# Patient Record
Sex: FEMALE | Race: AMERICAN INDIAN OR ALASKA NATIVE | Employment: FULL TIME | URBAN - METROPOLITAN AREA
[De-identification: names, ages, dates, MRNs, and addresses within clinical notes are randomized per-mention and may not be internally consistent; named-entity substitution may affect disease eponyms.]

---

## 2024-01-12 NOTE — PROGRESS NOTES
ADULT ANNUAL PHYSICAL  [facility] [place]    NAME: Mari Eisenberg  AGE: 28 y.o. SEX: female  : 1995     DATE: 1/15/2024     Assessment and Plan:     Problem List Items Addressed This Visit       Obesity     No comorbidities     Plan:  Lifestyle modifications           Family planning     POCT pregnancy test negative  LMP: 1/10/24    Plan:  Resume Sronyx          Relevant Medications    levonorgestrel-ethinyl estradiol (Sronyx) 0.1-20 MG-MCG per tablet    Other Relevant Orders    POCT urine HCG    Encounter for 's license history and physical - Primary     Patient does not appear to have any medical disorder that would prevent him from driving safely  Advised to report back to [facility] immediately if any new conditions or limitations develop.   Advised not to use alcohol, drugs, or substances which inhibit ability to operate a vehicle.   Advised not use cell phone or other devices which can distract while operating a vehicle.   Form filled out, signed, and handed back to the patient.           Other Visit Diagnoses       Need for hepatitis C screening test        Relevant Orders    Hepatitis C Antibody    Lipid panel    Comprehensive metabolic panel            Immunizations and preventive care screenings were discussed with patient today. Appropriate education was printed on patient's after visit summary.    Counseling:  Alcohol/drug use: discussed moderation in alcohol intake, the recommendations for healthy alcohol use, and avoidance of illicit drug use.  Sexual health: discussed sexually transmitted diseases, partner selection, use of condoms, avoidance of unintended pregnancy, and contraceptive alternatives.  Exercise: the importance of regular exercise/physical activity was discussed. Recommend exercise 3-5 times per week for at least 30 minutes.          Return in about 1 year (around 1/15/2025) for  Annual physical.     Chief Complaint:     Chief Complaint   Patient presents with    New Patient Visit     NP here to establish care      History of Present Illness:     Adult Annual Physical   Patient here for a comprehensive physical exam. The patient reports no problems.    Diet and Physical Activity  Diet/Nutrition: frequent junk food and limited fruits/vegetables.   Exercise: no formal exercise.      Depression Screening  PHQ-2/9 Depression Screening    Little interest or pleasure in doing things: 0 - not at all  Feeling down, depressed, or hopeless: 0 - not at all  PHQ-2 Score: 0  PHQ-2 Interpretation: Negative depression screen       General Health  Sleep: gets 7-8 hours of sleep on average.   Hearing: normal - bilateral.  Vision: no vision problems.   Dental: regular dental visits, brushes teeth twice daily, and flosses teeth occasionally.       /GYN Health  Follows with gynecology? No. Last pap smear 8 months ago (normal)  Last menstrual period: 1/10/2024  Contraceptive method:  none  . Used to be on Sronyx. Denies smoking, migraines or history of blood clots   History of STDs?: no.     Advanced Care Planning  Do you have an advanced directive? no  Do you have a durable medical power of ? no     Review of Systems:     Review of Systems   Constitutional:  Negative for chills and fever.   HENT:  Negative for ear pain and sore throat.    Eyes:  Negative for pain and visual disturbance.   Respiratory:  Negative for cough and shortness of breath.    Cardiovascular:  Negative for chest pain and palpitations.   Gastrointestinal:  Negative for abdominal pain and vomiting.   Genitourinary:  Negative for dysuria and hematuria.   Musculoskeletal:  Negative for arthralgias and back pain.   Skin:  Negative for color change and rash.   Neurological:  Negative for seizures and syncope.   All other systems reviewed and are negative.     Past Medical History:     No past medical history on file.   Past Surgical  "History:     No past surgical history on file.   Social History:     Social History     Socioeconomic History    Marital status: /Civil Union     Spouse name: None    Number of children: None    Years of education: None    Highest education level: None   Occupational History    None   Tobacco Use    Smoking status: Never     Passive exposure: Never    Smokeless tobacco: Never   Vaping Use    Vaping status: Never Used   Substance and Sexual Activity    Alcohol use: Not Currently    Drug use: Never    Sexual activity: None   Other Topics Concern    None   Social History Narrative    None     Social Determinants of Health     Financial Resource Strain: Low Risk  (1/15/2024)    Overall Financial Resource Strain (CARDIA)     Difficulty of Paying Living Expenses: Not hard at all   Food Insecurity: No Food Insecurity (1/15/2024)    Hunger Vital Sign     Worried About Running Out of Food in the Last Year: Never true     Ran Out of Food in the Last Year: Never true   Transportation Needs: No Transportation Needs (1/15/2024)    PRAPARE - Transportation     Lack of Transportation (Medical): No     Lack of Transportation (Non-Medical): No   Physical Activity: Not on file   Stress: Not on file   Social Connections: Not on file   Intimate Partner Violence: Not on file   Housing Stability: Not on file      Family History:     No family history on file.   Current Medications:     Current Outpatient Medications   Medication Sig Dispense Refill    levonorgestrel-ethinyl estradiol (Sronyx) 0.1-20 MG-MCG per tablet Take 1 tablet by mouth daily 84 tablet 3     No current facility-administered medications for this visit.      Allergies:     No Known Allergies   Physical Exam:     /76 (BP Location: Right arm, Patient Position: Sitting, Cuff Size: Standard)   Pulse 82   Temp 97.8 °F (36.6 °C) (Temporal)   Resp 18   Ht 5' 6\" (1.676 m)   Wt 85.7 kg (189 lb)   LMP 01/14/2024 (Exact Date)   SpO2 99%   BMI 30.51 kg/m² "     Physical Exam  Vitals and nursing note reviewed.   Constitutional:       General: She is not in acute distress.     Appearance: She is well-developed.   HENT:      Head: Normocephalic and atraumatic.      Right Ear: External ear normal.      Left Ear: External ear normal.      Nose: Nose normal.   Eyes:      General: No scleral icterus.     Conjunctiva/sclera: Conjunctivae normal.   Cardiovascular:      Rate and Rhythm: Normal rate and regular rhythm.      Pulses: Normal pulses.      Heart sounds: Normal heart sounds. No murmur heard.  Pulmonary:      Effort: Pulmonary effort is normal. No respiratory distress.      Breath sounds: Normal breath sounds.   Abdominal:      Palpations: Abdomen is soft.      Tenderness: There is no abdominal tenderness.   Musculoskeletal:         General: No swelling.      Cervical back: Neck supple.      Right lower leg: No edema.      Left lower leg: No edema.   Skin:     General: Skin is warm and dry.      Capillary Refill: Capillary refill takes less than 2 seconds.   Neurological:      General: No focal deficit present.      Mental Status: She is alert and oriented to person, place, and time.   Psychiatric:         Mood and Affect: Mood normal.         Behavior: Behavior normal.          Rody Morataya MD   [facility]

## 2024-01-15 ENCOUNTER — OFFICE VISIT (OUTPATIENT)
Dept: FAMILY MEDICINE CLINIC | Facility: CLINIC | Age: 29
End: 2024-01-15

## 2024-01-15 VITALS
RESPIRATION RATE: 18 BRPM | HEART RATE: 82 BPM | SYSTOLIC BLOOD PRESSURE: 114 MMHG | HEIGHT: 66 IN | BODY MASS INDEX: 30.37 KG/M2 | TEMPERATURE: 97.8 F | WEIGHT: 189 LBS | OXYGEN SATURATION: 99 % | DIASTOLIC BLOOD PRESSURE: 76 MMHG

## 2024-01-15 DIAGNOSIS — Z11.59 NEED FOR HEPATITIS C SCREENING TEST: ICD-10-CM

## 2024-01-15 DIAGNOSIS — Z30.09 FAMILY PLANNING: ICD-10-CM

## 2024-01-15 DIAGNOSIS — E66.09 CLASS 1 OBESITY DUE TO EXCESS CALORIES WITHOUT SERIOUS COMORBIDITY WITH BODY MASS INDEX (BMI) OF 30.0 TO 30.9 IN ADULT: ICD-10-CM

## 2024-01-15 DIAGNOSIS — Z02.4 ENCOUNTER FOR DRIVER'S LICENSE HISTORY AND PHYSICAL: Primary | ICD-10-CM

## 2024-01-15 PROBLEM — Z00.00 ANNUAL PHYSICAL EXAM: Status: ACTIVE | Noted: 2024-01-15

## 2024-01-15 PROBLEM — E66.9 OBESITY: Status: ACTIVE | Noted: 2024-01-15

## 2024-01-15 LAB — SL AMB POCT URINE HCG: NEGATIVE

## 2024-01-15 PROCEDURE — 81025 URINE PREGNANCY TEST: CPT | Performed by: FAMILY MEDICINE

## 2024-01-15 PROCEDURE — 99395 PREV VISIT EST AGE 18-39: CPT | Performed by: FAMILY MEDICINE

## 2024-01-15 RX ORDER — LEVONORGESTREL AND ETHINYL ESTRADIOL 0.1-0.02MG
1 KIT ORAL DAILY
Qty: 84 TABLET | Refills: 3 | Status: SHIPPED | OUTPATIENT
Start: 2024-01-15

## 2024-01-15 NOTE — ASSESSMENT & PLAN NOTE
Patient does not appear to have any medical disorder that would prevent him from driving safely  Advised to report back to [facility] immediately if any new conditions or limitations develop.   Advised not to use alcohol, drugs, or substances which inhibit ability to operate a vehicle.   Advised not use cell phone or other devices which can distract while operating a vehicle.   Form filled out, signed, and handed back to the patient.

## 2024-01-24 ENCOUNTER — APPOINTMENT (OUTPATIENT)
Dept: RADIOLOGY | Age: 29
End: 2024-01-24
Payer: OTHER MISCELLANEOUS

## 2024-01-24 ENCOUNTER — OCCMED (OUTPATIENT)
Dept: URGENT CARE | Age: 29
End: 2024-01-24
Payer: OTHER MISCELLANEOUS

## 2024-01-24 DIAGNOSIS — S69.91XA HAND INJURY, RIGHT, INITIAL ENCOUNTER: Primary | ICD-10-CM

## 2024-01-24 DIAGNOSIS — S69.91XA HAND INJURY, RIGHT, INITIAL ENCOUNTER: ICD-10-CM

## 2024-01-24 PROCEDURE — 99283 EMERGENCY DEPT VISIT LOW MDM: CPT | Performed by: NURSE PRACTITIONER

## 2024-01-24 PROCEDURE — 73110 X-RAY EXAM OF WRIST: CPT

## 2024-01-24 PROCEDURE — G0382 LEV 3 HOSP TYPE B ED VISIT: HCPCS | Performed by: NURSE PRACTITIONER

## 2024-01-24 PROCEDURE — 73130 X-RAY EXAM OF HAND: CPT

## 2024-01-26 ENCOUNTER — APPOINTMENT (OUTPATIENT)
Dept: URGENT CARE | Age: 29
End: 2024-01-26
Payer: OTHER MISCELLANEOUS

## 2024-01-26 PROCEDURE — 99214 OFFICE O/P EST MOD 30 MIN: CPT | Performed by: PHYSICIAN ASSISTANT

## 2024-02-02 ENCOUNTER — APPOINTMENT (OUTPATIENT)
Dept: URGENT CARE | Age: 29
End: 2024-02-02
Payer: OTHER MISCELLANEOUS

## 2024-02-02 PROCEDURE — 99213 OFFICE O/P EST LOW 20 MIN: CPT | Performed by: PHYSICIAN ASSISTANT

## 2024-02-21 PROBLEM — Z02.4 ENCOUNTER FOR DRIVER'S LICENSE HISTORY AND PHYSICAL: Status: RESOLVED | Noted: 2024-01-15 | Resolved: 2024-02-21

## 2024-04-10 DIAGNOSIS — Z30.09 FAMILY PLANNING: ICD-10-CM

## 2024-04-10 RX ORDER — LEVONORGESTREL AND ETHINYL ESTRADIOL 0.1-0.02MG
1 KIT ORAL DAILY
Qty: 84 TABLET | Refills: 3 | Status: SHIPPED | OUTPATIENT
Start: 2024-04-10

## 2024-07-30 ENCOUNTER — VBI (OUTPATIENT)
Dept: ADMINISTRATIVE | Facility: OTHER | Age: 29
End: 2024-07-30

## 2024-07-30 NOTE — TELEPHONE ENCOUNTER
07/30/24 2:43 PM     Chart reviewed for Pap Smear (HPV) aka Cervical Cancer Screening ; nothing is submitted to the patient's insurance at this time.     Lavern Patel   [facility]

## 2024-08-12 ENCOUNTER — TELEPHONE (OUTPATIENT)
Dept: FAMILY MEDICINE CLINIC | Facility: CLINIC | Age: 29
End: 2024-08-12

## 2024-08-12 ENCOUNTER — CLINICAL SUPPORT (OUTPATIENT)
Dept: FAMILY MEDICINE CLINIC | Facility: CLINIC | Age: 29
End: 2024-08-12

## 2024-08-12 DIAGNOSIS — Z11.1 SCREENING FOR TUBERCULOSIS: Primary | ICD-10-CM

## 2024-08-12 PROCEDURE — 86580 TB INTRADERMAL TEST: CPT

## 2024-08-12 NOTE — TELEPHONE ENCOUNTER
Staff health appraisal form received on 8/12/24  to be completed by PCP. Copy made and placed in PCP folder.    Learner's permit application    Forms to be delivered to PCP mailbox at assigned time.     Pt stated she need this forms completed by 8/22. Informed it can take 5-10 days to be completed by pcp.

## 2024-08-14 ENCOUNTER — CLINICAL SUPPORT (OUTPATIENT)
Dept: FAMILY MEDICINE CLINIC | Facility: CLINIC | Age: 29
End: 2024-08-14

## 2024-08-14 DIAGNOSIS — Z11.1 SCREENING FOR TUBERCULOSIS: Primary | ICD-10-CM

## 2024-08-14 LAB
INDURATION: 0 MM
TB SKIN TEST: NEGATIVE

## 2024-12-23 ENCOUNTER — OFFICE VISIT (OUTPATIENT)
Dept: FAMILY MEDICINE CLINIC | Facility: CLINIC | Age: 29
End: 2024-12-23

## 2024-12-23 VITALS
TEMPERATURE: 97.5 F | WEIGHT: 175.4 LBS | HEIGHT: 66 IN | OXYGEN SATURATION: 99 % | RESPIRATION RATE: 18 BRPM | SYSTOLIC BLOOD PRESSURE: 124 MMHG | DIASTOLIC BLOOD PRESSURE: 84 MMHG | HEART RATE: 87 BPM | BODY MASS INDEX: 28.19 KG/M2

## 2024-12-23 DIAGNOSIS — H10.33 ACUTE BACTERIAL CONJUNCTIVITIS OF BOTH EYES: ICD-10-CM

## 2024-12-23 DIAGNOSIS — R68.89 FLU-LIKE SYMPTOMS: Primary | ICD-10-CM

## 2024-12-23 LAB
SARS-COV-2 AG UPPER RESP QL IA: NEGATIVE
VALID CONTROL: NORMAL

## 2024-12-23 RX ORDER — IBUPROFEN 800 MG/1
800 TABLET, FILM COATED ORAL EVERY 6 HOURS PRN
Qty: 30 TABLET | Refills: 0 | Status: SHIPPED | OUTPATIENT
Start: 2024-12-23

## 2024-12-23 RX ORDER — OXYMETAZOLINE HYDROCHLORIDE 0.05 G/100ML
2 SPRAY NASAL 2 TIMES DAILY
Qty: 12 ML | Refills: 0 | Status: SHIPPED | OUTPATIENT
Start: 2024-12-23

## 2024-12-23 RX ORDER — CETIRIZINE HYDROCHLORIDE 10 MG/1
10 TABLET ORAL DAILY
Qty: 30 TABLET | Refills: 0 | Status: SHIPPED | OUTPATIENT
Start: 2024-12-23

## 2024-12-23 RX ORDER — GUAIFENESIN, PSEUDOEPHEDRINE HYDROCHLORIDE 600; 60 MG/1; MG/1
1 TABLET, EXTENDED RELEASE ORAL EVERY 12 HOURS
Qty: 60 TABLET | Refills: 0 | Status: SHIPPED | OUTPATIENT
Start: 2024-12-23

## 2024-12-23 RX ORDER — ERYTHROMYCIN 5 MG/G
0.5 OINTMENT OPHTHALMIC EVERY 6 HOURS SCHEDULED
Qty: 28 G | Refills: 0 | Status: SHIPPED | OUTPATIENT
Start: 2024-12-23 | End: 2024-12-30

## 2024-12-23 NOTE — PROGRESS NOTES
Name: Mari Eisenberg      : 1995      MRN: [id number]  Encounter Provider: RUBINA Moraes  Encounter Date: 2024   Encounter department: [facility]  :  Assessment & Plan  Flu-like symptoms  - Negative Covid, unable to do Rapid flu in office  - Symptoms management, encourage rest and hydration  - F/u as needed   Orders:    pseudoephedrine-guaifenesin (MUCINEX D)  MG per tablet; Take 1 tablet by mouth every 12 (twelve) hours    tetrahydrozoline-zinc (VISINE-AC) 0.05-0.25 % ophthalmic solution; Apply 2 drops to eye 3 (three) times a day as needed (for itching eye)    POCT Rapid Covid Ag    oxymetazoline (AFRIN) 0.05 % nasal spray; 2 sprays by Each Nare route 2 (two) times a day    ibuprofen (MOTRIN) 800 mg tablet; Take 1 tablet (800 mg total) by mouth every 6 (six) hours as needed for mild pain, moderate pain, fever or headaches    cetirizine (ZyrTEC) 10 mg tablet; Take 1 tablet (10 mg total) by mouth daily    Acute bacterial conjunctivitis of both eyes  - Infection control discussed  - ED percussion discussed   - F/U as needed   Orders:    erythromycin (ILOTYCIN) ophthalmic ointment; Administer 0.5 inches to both eyes every 6 (six) hours for 7 days           History of Present Illness     Patient is a 29 y.o. female whom  has no past medical history on file. who is seen today in office for flu like symptoms that started on Friday with running nose. On Saturday Pt developed fever and body ache. Other associated symptoms includes sneezing, congestion, HA.She report sick contact at work. Pt reports yesterday she woke up with redness, itching and purulent discharge in both eyes. Pt reports that her eyes were shut, the right is severe then the left. Pt reports taking Tylenol 1200 mg every 6 hrs for symptoms with some relief.              Review of Systems   Constitutional:  Positive for chills and fever.   HENT:  Positive for congestion, ear pain, postnasal  "drip, rhinorrhea, sinus pressure and sneezing. Negative for sore throat, tinnitus and trouble swallowing.    Eyes:  Positive for discharge, redness and itching. Negative for pain and visual disturbance.   Respiratory:  Positive for cough. Negative for chest tightness and shortness of breath.    Cardiovascular:  Negative for chest pain and palpitations.   Gastrointestinal: Negative.    Endocrine: Negative.    Genitourinary: Negative.    Musculoskeletal:  Positive for myalgias.   Skin: Negative.    Allergic/Immunologic: Negative for environmental allergies, food allergies and immunocompromised state.   Neurological: Negative.    Hematological:  Negative for adenopathy. Does not bruise/bleed easily.   Psychiatric/Behavioral: Negative.         Objective   /84 (BP Location: Left arm, Patient Position: Standing, Cuff Size: Standard)   Pulse 87   Temp 97.5 °F (36.4 °C) (Temporal)   Resp 18   Ht 5' 6\" (1.676 m)   Wt 79.6 kg (175 lb 6.4 oz)   SpO2 99%   BMI 28.31 kg/m²      Physical Exam  Constitutional:       General: She is not in acute distress.     Appearance: Normal appearance. She is not ill-appearing.   HENT:      Head: Normocephalic and atraumatic.      Right Ear: Tympanic membrane normal.      Left Ear: Tympanic membrane normal.      Nose: Congestion and rhinorrhea present.      Mouth/Throat:      Mouth: Mucous membranes are moist.      Pharynx: No oropharyngeal exudate or posterior oropharyngeal erythema.   Eyes:      General:         Right eye: Discharge present.      Extraocular Movements: Extraocular movements intact.      Pupils: Pupils are equal, round, and reactive to light.      Comments: Conjunctivitis    Cardiovascular:      Rate and Rhythm: Normal rate.      Pulses: Normal pulses.      Heart sounds: Normal heart sounds.   Pulmonary:      Effort: Pulmonary effort is normal.      Breath sounds: Normal breath sounds.   Abdominal:      General: Bowel sounds are normal.      Palpations: Abdomen is " soft.   Musculoskeletal:         General: Normal range of motion.      Cervical back: Normal range of motion.   Skin:     General: Skin is warm and dry.   Neurological:      General: No focal deficit present.      Mental Status: She is alert and oriented to person, place, and time.   Psychiatric:         Mood and Affect: Mood normal.         Behavior: Behavior normal.

## 2024-12-23 NOTE — LETTER
December 23, 2024     Patient: Mari Eisenberg  YOB: 1995  Date of Visit: 12/23/2024      To Whom it May Concern:    Mari Eisenberg is under my professional care. Mari was seen in my office on 12/23/2024. Mari may return to work on 12/26/24 .    If you have any questions or concerns, please don't hesitate to call.         Sincerely,          SayRUBINA Tapia        CC: No Recipients

## 2025-01-16 DIAGNOSIS — Z30.09 FAMILY PLANNING: ICD-10-CM

## 2025-01-16 RX ORDER — LEVONORGESTREL/ETHIN.ESTRADIOL 0.1-0.02MG
1 TABLET ORAL DAILY
Qty: 84 TABLET | Refills: 3 | Status: SHIPPED | OUTPATIENT
Start: 2025-01-16

## 2025-02-17 ENCOUNTER — OFFICE VISIT (OUTPATIENT)
Dept: FAMILY MEDICINE CLINIC | Facility: CLINIC | Age: 30
End: 2025-02-17

## 2025-02-17 VITALS
SYSTOLIC BLOOD PRESSURE: 116 MMHG | OXYGEN SATURATION: 98 % | WEIGHT: 184.9 LBS | HEART RATE: 79 BPM | DIASTOLIC BLOOD PRESSURE: 72 MMHG | RESPIRATION RATE: 18 BRPM | BODY MASS INDEX: 29.72 KG/M2 | HEIGHT: 66 IN | TEMPERATURE: 98.2 F

## 2025-02-17 DIAGNOSIS — D24.9: ICD-10-CM

## 2025-02-17 DIAGNOSIS — Z00.00 ANNUAL PHYSICAL EXAM: Primary | ICD-10-CM

## 2025-02-17 NOTE — PATIENT INSTRUCTIONS
"Patient Education     Dieta y irene   Conceptos Básicos   Redactado por los médicos y editores de [employer]ToDate   ¿Por qué es importante llevar morena dieta saludable? -- Llevar morena dieta saludable es importante porque consumir los alimentos adecuados puede ayudarlo a conservar zavala irene ahora y más adelante. Puede reducir el riesgo de problemas alex enfermedad cardíaca, diabetes, presión arterial adilia y algunos tipos de cáncer. También puede ayudarlo a vivir más tiempo y mejorar zavala calidad de beatriz.  ¿Cuál es la mejor clase de dieta? -- Los expertos no recomiendan ninguna dieta específica para todos. La gente elige qué alimentos consumir por morena gran variedad de motivos, alex zavala preferencia personal, motivos culturales o religiosos, alergias o intolerancias, y objetivos nutricionales. Además, deben tener en cuenta el costo de los distintos alimentos y la posibilidad de conseguirlos.  En general, los expertos recomiendan morena dieta que:   Incluya muchas verduras, frutas, legumbres, rony secos y cereales integrales   Limite el consumo de arelis oseguera y procesadas, grasas no saludables, azúcar, sal y alcohol  ¿Qué son los patrones alimentarios? -- Por lo general, un \"patrón\" alimentario significa consumir ciertos tipos de alimentos y limitar otros. Algunas personas deben seguir un patrón alimentario determinado debido a necesidades de irene. Por ejemplo, si tiene presión arterial adilia, zavala médico podría recomendarle morena dieta baja en sal.  Si está tratando de mejorar zavala irene en general, puede ser de ayuda escoger un patrón alimentario saludable. No implica necesariamente ser muy estricto con respecto a lo que come y lo que merary comer. La idea es que piense en consumir muchos alimentos saludables y a la vez limite los alimentos menos saludables.  Estos son algunos ejemplos de patrones alimentarios saludables:   Dieta mediterránea - Consiste en consumir muchas frutas, verduras, rony secos y cereales integrales, y usar aceite " de ford en lugar de otras grasas. La dieta también contiene algo de pescado, aves y productos lácteos, oneida no morena gran cantidad de arelis oseguera. Seguir esta dieta puede ser de ayuda para la irene en general, y hasta podría reducir el riesgo de tener un accidente cerebrovascular (derrame).   Dietas basadas en verduras - Estos patrones dan prioridad a las verduras, las frutas, los cereales, las legumbres y los rony secos. Limitan o evitan los alimentos de origen animal, alex la carne y los lácteos. Hay muchos tipos de dietas basadas en verduras, alex la vegetariana y la vegana.   Dieta baja en grasa - Morena dieta baja en grasa consiste en limitar las calorías que provienen de las grasas. Warba podría ayudar a algunas personas a no subir de peso, si kin es zavala objetivo, oneida no tiene muchos más beneficios para la irene. Si elige morena dieta baja en grasa, también es importante que se asegure de consumir muchos cereales integrales, legumbres, frutas y verduras. Limite el consumo de cereales y azúcar refinados.   Dieta baja en colesterol - El colesterol está presente en alimentos que contienen muchas grasas saturadas, alex las arelis oseguera, la mantequilla y el queso. Morena dieta baja en colesterol hace hincapié en limitar la cantidad de colesterol que consume. Limitar el colesterol en la dieta también puede ayudar a reducir la cantidad de grasas no saludables consumidas.  ¿Qué alimentos son particularmente saludables? -- Estos son algunos alimentos particularmente saludables:   Frutas y verduras - Consumir morena dieta con muchas frutas y verduras puede ayudar a prevenir las enfermedades cardíacas y los accidentes cerebrovasculares (derrames). También podría ayudar a prevenir algunos tipos de cáncer. Trate de consumir frutas y verduras en cada comida y también alex refrigerio. Si no tiene acceso a frutas y verduras frescas, puede consumirlas congeladas o en dajuan. Los médicos recomiendan consumir un mínimo de evelyn porciones de  "frutas o verduras por día.   Cereales integrales - Entre los cereales integrales se cuentan el pan elaborado con fly cien por ciento integral, la alexus cortada al shruti y las pastas elaboradas con cereales integrales. Son más saludables que los alimentos elaborados con cereales \"refinados\", alex el pan winters y el arroz winters. Se ha demostrado que consumir muchos cereales integrales en vez de cereales refinados ayuda a controlar el peso. También puede reducir el riesgo de padecer varios problemas de irene, alex el cáncer de colon, la enfermedad cardíaca y la diabetes. Los médicos recomiendan que la mayoría de las personas intenten comer de 5 a 8 porciones de alimentos integrales con un alto contenido de fibra por día.   Alimentos con fibra - Consumir alimentos con mucha fibra puede ayudar a prevenir las enfermedades cardíacas y los accidentes cerebrovasculares (derrames). Si tiene diabetes tipo 2, también puede ayudar a controlar zavala nivel de azúcar en williams. Algunos alimentos que tienen mucha fibra son las verduras, las frutas, los frijoles, las nueces, el cereal de alexus y los panes y cereales integrales. Puede averiguar qué cantidad de fibra tiene un alimento si darek la etiqueta de información nutricional (figura 1). Los médicos recomiendan que la mayoría de las personas consuman alrededor de 25 a 34 gramos de fibra por día.   Alimentos con calcio y vitamina D - Los bebés, niños y adultos necesitan calcio y vitamina D para que dolores huesos bella lucy. Los adultos también necesitan calcio y vitamina D para ayudar a prevenir la osteoporosis. La osteoporosis es un padecimiento que hace que los huesos se debiliten y se rompan con más facilidad que de costumbre. Distintos alimentos y bebidas tienen calcio y vitamina D (figura 2). Es posible que las personas que no reciben suficiente calcio y vitamina D en zavala dieta deban gregorio un suplemento. Los médicos recomiendan que la mayoría de las personas consuman de 2 a 3 " "porciones de alimentos con calcio y vitamina D todos los días.   Alimentos que contienen proteína - Las proteínas contribuyen a que los músculos y los huesos se mantengan lucy. Algunos alimentos saludables con un alto contenido de proteína son el erickson, el pescado, los huevos, los frijoles, los rony secos y los productos a base de soja. La carne noelle también tiene mucha proteína, oneida además contiene grasas que pueden ser poco saludables. Los médicos recomiendan que la mayoría de las personas intenten consumir alrededor de 5 porciones de proteína por día.   Grasas saludables - Existen distintas clases de grasas. Algunas son mejores para el organismo que otras. Las grasas saludables son grasas \"monoinsaturadas\" o \"poliinsaturadas\". Están presentes en los pescados grasos, en los rony secos, en las mantequillas de rony secos y en el aguacate. Use aceites vegetales para cocinar. Algunos ejemplos de estos aceites son el de ford, el de canola, el de cártamo, el de girasol y el de maíz. Consumir alimentos con grasas saludables, y evitar o limitar aquellos con grasas no saludables, podría reducir el riesgo de padecer enfermedad cardíaca.   Alimentos con folato - El folato es morena vitamina importante para las personas embarazadas, ya que ayuda a prevenir ciertos defectos de nacimiento. También se llama \"ácido fólico\". Todas las personas que podrían quedar embarazadas deben recibir al menos 400 microgramos diarios de ácido fólico, tanto si están buscando un embarazo alex si no. El folato se encuentra en muchos cereales de desayuno, en las naranjas, en el jugo de naranja y en las verduras de hojas verdes.  ¿Qué alimentos debería evitar o limitar? -- Para tener morena dieta saludable, hay algunas cosas que debería evitar o limitar. Son, entre otros:   Grasas no saludables - Las grasas \"trans\" son particularmente poco saludables. Se encuentran en margarinas, muchas comidas rápidas y algunos alimentos horneados comprados en " "tiendas. Las grasas \"saturadas\" se encuentran en productos de origen animal alex las rishi, la yema de huevo, la mantequilla, el queso y los productos lácteos enteros. Las grasas no saludables pueden elevar el nivel de colesterol y aumentar las posibilidades de sufrir enfermedad cardíaca.   Azúcar - Para tener morena dieta saludable, es importante limitar o evitar el azúcar agregado, los dulces y los cereales refinados. Los cereales refinados se encuentran en el pan winters, el arroz winters, la mayoría de las pastas y la mayoría de los alimentos de \"refrigerio\" envasados.  Evitar las bebidas endulzadas con azúcar, alex las gaseosas y las bebidas deportivas, también puede contribuir a mejorar la irene.  Evite las frutas enlatadas en almíbar con un alto porcentaje de azúcar.   Rishi oseguera y procesadas - Se ha demostrado en estudios que consumir carne noelle en gran cantidad puede aumentar el riesgo de padecer ciertos problemas de irene, entre ellos enfermedad cardíaca y cáncer. Debe limitar la cantidad de carne noelle que consume. Lo mismo aplica a las rishi procesadas, por ejemplo las salchichas, los hot dogs y el tocino (loza).  ¿Puedo beber alcohol alex parte de morena dieta saludable? -- No beber nada de alcohol es la opción más saludable. Beber regularmente puede aumentar las posibilidades de tener enfermedad hepática y ciertos tipos de cáncer. En las personas de sexo femenino, incluso morena nacho copa por día puede aumentar el riesgo de desarrollar cáncer de seno.  Si decide beber, la mayoría de los médicos recomiendan consumir no más de:   1 copa al día para las personas de sexo femenino   2 copas al día para las personas de sexo masculino  Los límites son distintos porque, en general, el cuerpo femenino tarda más en procesar el alcohol.  ¿Cuántas calorías necesito por día? -- Las calorías le dan energía a zavala cuerpo. La cantidad de calorías que necesita por día depende de zavala peso, estatura, edad, sexo y nivel de " "actividad.  Zavala médico o enfermero puede decirle aproximadamente cuántas calorías debe consumir por día. También puede colaborar con un nutricionista (experto en nutrición) para informarse sobre dolores necesidades y opciones alimentarias.  ¿Qué pasa si me mamie mejorar mi dieta? -- Cambiar dolores hábitos de alimentación puede ser difícil. Recuerde que hasta los pequeños cambios pueden mejorar zavala irene.  Estas sugerencias podrían ser de ayuda:   Trate de incorporar frutas y verduras a todas dolores comidas. Si no tiene frutas y verduras frescas, las congeladas y enlatadas son buenas opciones. Busque productos que no tengan osman ni azúcar agregado.   Tenga frutas a la vista para comerlas alex refrigerio.   Cuando pueda, opte por cereales integrales en lugar de refinados. Opte por consumir erickson, pescado y legumbres en lugar de arelis oseguera y queso.   Trate de consumir alimentos preparados y procesados con menos frecuencia.   Pruebe a beber agua o agua con gas saborizada en lugar de gaseosa o jugo.   Cuando coma en restaurantes de comida rápida, busque las opciones más saludables del menú, alex erickson asado o morena ensalada.  Si tiene alguna pregunta sobre cuáles alimentos debe consumir y cuáles no, consulte a zavala médico, enfermero o nutricionista. La dieta adecuada para usted depende, en parte, de zavala irene y de los padecimientos médicos que tenga.  Todos los artículos se actualizan a medida que se descubre nueva evidencia y culmina nuestro proceso de evaluación por homólogos   Gloria artículo se recuperó de UpToDate el: Feb 28, 2024.  Artículo 34222 Versión 26.0.es-419.1  Release: 32.2.4 - C32.58  © 2024 [employer] Todos los derechos reservados.  figura 1: Etiqueta de información nutricional - Fibra     Gloria es un ejemplo de morena etiqueta de información nutricional. Para saber cuánta fibra contiene un alimento, consulte el renglón que dice \"fibra dietaria\". También es importante observar el tamaño de la porción. Gloria alimento tiene " "7 gramos de fibra en cada porción y cada porción es morena taza.  Gráfico 95549 Versión 8.0  figura 2: Alimentos y bebidas con calcio y vitamina D     Entre los alimentos ricos en calcio se incluyen el helado, la leche de soya, los panes, la col rizada, el brócoli, la leche, el queso, el requesón, las almendras, el yogur, los cereales listos para comer, los frijoles y el tofu. Entre los alimentos ricos en vitamina D se incluyen la leche, las \"leches\" vegetales fortificadas (soya, almendras), el atún en dajuan, el aceite de hígado de bacalao, el yogur, los cereales listos para comer, el salmón cocido, las khris en dajuan, la caballa y los huevos. Algunos de estos alimentos son ricos en ambas cosas.  Gráfico 69160 Versión 4.0  Exención de responsabilidad y uso de la información del consumidor   Descargo de responsabilidad: esta información generalizada es un resumen limitado de información sobre el diagnóstico, el tratamiento y/o los medicamentos. No pretende ser exhaustiva y se debe utilizar alex herramienta para ayudar al usuario a comprender y/o evaluar las posibles opciones de diagnóstico y tratamiento. No incluye toda la información sobre afecciones, tratamientos, medicamentos, efectos secundarios o riesgos puedan ser aplicables a un paciente específico. No tiene el propósito de servir alex recomendación médica ni de sustituir la recomendación médica, el diagnóstico o el tratamiento de un profesional de atención médica que se base en el examen y la evaluación de angel profesional de la irene respecto a las circunstancias específicas y únicas del paciente. Los pacientes deben hablar con un profesional de atención médica para obtener información completa sobre zavala irene, cuestiones médicas y opciones de tratamiento, incluidos los riesgos o los beneficios relacionados con el uso de medicamentos. Esta información no certifica que los tratamientos o medicamentos bella seguros, eficaces o estén aprobados para tratar a un " paciente específico. Up[employer]Date[employer]. y dolores afiliados renuncian a cualquier garantía o responsabilidad relacionada con esta información o el uso de la misma.El uso de esta información está sujeto a las Condiciones de uso, disponibles en https://www.[employer].com/en/know/clinical-effectiveness-terms. 2024© [employer] y dolores afiliados y/o licenciantes. Todos los derechos reservados.  Copyright   © 2024 [employer] Todos los derechos reservados.

## 2025-02-17 NOTE — ASSESSMENT & PLAN NOTE
Us breast BL US 2017: BI-RADS 3 outside ultrasound, biopsy proven   right breast fibroadenoma  Right breast with 5 hypoechoic nodules   Left breast with 2 fatty small lobules unchanged  Please refer to U/S finding on breast     -F/U U/S of Bilateral Breast to assess for any change     Orders:    US breast screening bilateral complete (ABUS); Future

## 2025-02-17 NOTE — PROGRESS NOTES
Adult Annual Physical  Name: Mari Eisenberg      : 1995      MRN: [id number]  Encounter Provider: Mariam Varner MD  Encounter Date: 2025   Encounter department: [facility]    Assessment & Plan  Annual physical exam  Pap smear due (patient will make appointment for women wellness exam)  Orders:    Ambulatory Referral to Optometry; Future    Breast fibroadenoma, unspecified laterality  Us breast BL US 2017: BI-RADS 3 outside ultrasound, biopsy proven   right breast fibroadenoma  Right breast with 5 hypoechoic nodules   Left breast with 2 fatty small lobules unchanged  Please refer to U/S finding on breast     -F/U U/S of Bilateral Breast to assess for any change     Orders:    US breast screening bilateral complete (ABUS); Future      Immunizations and preventive care screenings were discussed with patient today. Appropriate education was printed on patient's after visit summary.    Counseling:  Alcohol/drug use: discussed moderation in alcohol intake, the recommendations for healthy alcohol use, and avoidance of illicit drug use.  Dental Health: discussed importance of regular tooth brushing, flossing, and dental visits.  Injury prevention: discussed safety/seat belts, safety helmets, smoke detectors, carbon monoxide detectors, and smoking near bedding or upholstery.  Sexual health: discussed sexually transmitted diseases, partner selection, use of condoms, avoidance of unintended pregnancy, and contraceptive alternatives.  Exercise: the importance of regular exercise/physical activity was discussed. Recommend exercise 3-5 times per week for at least 30 minutes.     BMI Counseling: Body mass index is 29.84 kg/m². The BMI is above normal. Nutrition recommendations include decreasing portion sizes. Exercise recommendations include exercising 3-5 times per week. No pharmacotherapy was ordered. Rationale for BMI follow-up plan is due to patient being  overweight or obese.     Depression Screening and Follow-up Plan: Patient's depression screening was positive with a PHQ-2 score of 3. Their PHQ-9 score was 12.   Patient assessed for underlying major depression. Brief counseling provided and recommend additional follow-up/re-evaluation next office visit. Patient advised to follow-up with PCP for further management.         History of Present Illness     Adult Annual Physical:  Patient presents for annual physical.     Diet and Physical Activity:  - Diet/Nutrition: well balanced diet.  - Exercise: walking.    Depression Screening:  - PHQ-2 Score: 3  - PHQ-9 Score: 12    General Health:  - Sleep: sleeps well.  - Hearing: normal hearing left ear.  - Vision: no vision problems.  - Dental: regular dental visits.    /GYN Health:  - Follows with GYN: no.   - Last menstrual cycle: 1/27/2025.   - History of STDs: no  - Contraception: oral contraceptives.      Advanced Care Planning:  - Has an advanced directive?: no    - Has a durable medical POA?: no    - ACP document given to patient?: yes      Review of Systems   Constitutional:  Negative for chills and fever.   HENT:  Negative for ear pain and sore throat.    Eyes:  Negative for pain and visual disturbance.   Respiratory:  Negative for cough and shortness of breath.    Cardiovascular:  Negative for chest pain and palpitations.   Gastrointestinal:  Negative for abdominal pain and vomiting.   Genitourinary:  Negative for dysuria and hematuria.   Musculoskeletal:  Negative for arthralgias and back pain.   Skin:  Negative for color change and rash.   Neurological:  Negative for seizures and syncope.   All other systems reviewed and are negative.    Pertinent Medical History           Medical History Reviewed by provider this encounter:     .  Past Medical History   No past medical history on file.  No past surgical history on file.  No family history on file.   reports that she has never smoked. She has never been exposed  "to tobacco smoke. She has never used smokeless tobacco. She reports that she does not currently use alcohol. She reports that she does not use drugs.  Current Outpatient Medications   Medication Instructions    cetirizine (ZYRTEC) 10 mg, Oral, Daily    ibuprofen (MOTRIN) 800 mg, Oral, Every 6 hours PRN    levonorgestrel-ethinyl estradiol (Sronyx) 0.1-20 MG-MCG per tablet 1 tablet, Oral, Daily    oxymetazoline (AFRIN) 0.05 % nasal spray 2 sprays, Each Nare, 2 times daily    pseudoephedrine-guaifenesin (MUCINEX D)  MG per tablet 1 tablet, Oral, Every 12 hours    tetrahydrozoline-zinc (VISINE-AC) 0.05-0.25 % ophthalmic solution 2 drops, Ophthalmic, 3 times daily PRN   No Known Allergies   Current Outpatient Medications on File Prior to Visit   Medication Sig Dispense Refill    cetirizine (ZyrTEC) 10 mg tablet Take 1 tablet (10 mg total) by mouth daily 30 tablet 0    ibuprofen (MOTRIN) 800 mg tablet Take 1 tablet (800 mg total) by mouth every 6 (six) hours as needed for mild pain, moderate pain, fever or headaches 30 tablet 0    levonorgestrel-ethinyl estradiol (Sronyx) 0.1-20 MG-MCG per tablet Take 1 tablet by mouth daily 84 tablet 3    oxymetazoline (AFRIN) 0.05 % nasal spray 2 sprays by Each Nare route 2 (two) times a day 12 mL 0    pseudoephedrine-guaifenesin (MUCINEX D)  MG per tablet Take 1 tablet by mouth every 12 (twelve) hours 60 tablet 0    tetrahydrozoline-zinc (VISINE-AC) 0.05-0.25 % ophthalmic solution Apply 2 drops to eye 3 (three) times a day as needed (for itching eye) 15 mL 0     No current facility-administered medications on file prior to visit.        Objective   /72 (BP Location: Right arm, Patient Position: Sitting, Cuff Size: Standard)   Pulse 79   Temp 98.2 °F (36.8 °C) (Temporal)   Resp 18   Ht 5' 6\" (1.676 m)   Wt 83.9 kg (184 lb 14.4 oz)   LMP  (LMP Unknown)   SpO2 98%   BMI 29.84 kg/m²     Physical Exam  Vitals and nursing note reviewed.   Constitutional:       " General: She is not in acute distress.     Appearance: She is well-developed.   HENT:      Head: Normocephalic and atraumatic.   Eyes:      Conjunctiva/sclera: Conjunctivae normal.   Cardiovascular:      Rate and Rhythm: Normal rate and regular rhythm.      Heart sounds: No murmur heard.  Pulmonary:      Effort: Pulmonary effort is normal. No respiratory distress.      Breath sounds: Normal breath sounds.   Chest:   Breasts:     Right: Normal. No bleeding, inverted nipple, mass, nipple discharge, skin change or tenderness.      Left: Normal. No bleeding, inverted nipple, mass, nipple discharge, skin change or tenderness.   Abdominal:      Palpations: Abdomen is soft.      Tenderness: There is no abdominal tenderness.   Musculoskeletal:         General: No swelling.      Cervical back: Neck supple.   Skin:     General: Skin is warm and dry.      Capillary Refill: Capillary refill takes less than 2 seconds.   Neurological:      Mental Status: She is alert.   Psychiatric:         Mood and Affect: Mood normal.

## 2025-03-11 ENCOUNTER — VBI (OUTPATIENT)
Dept: ADMINISTRATIVE | Facility: OTHER | Age: 30
End: 2025-03-11

## 2025-03-11 NOTE — TELEPHONE ENCOUNTER
03/11/25 8:41 AM     Chart reviewed for Pap Smear (HPV) aka Cervical Cancer Screening ; nothing is submitted to the patient's insurance at this time.     Ramiro Meneses MA   [facility]

## 2025-03-12 NOTE — PROGRESS NOTES
ANNUAL GYNECOLOGICAL EXAMINATION    Mari Eisenberg is a 29 y.o. female who presents today for annual GYN exam.  Her last pap smear was performed 2 years and result was normal.  She reports no history of abnormal pap smears in her past.  She has history of right breast fibroadenoma and is due for ultrasound which is scheduled to 3/25/2025.  She no had HIV screening.  She reports menses as regular.  Patient's last menstrual period was 02/26/2025 (approximate).  Her general medical history has been reviewed and she reports it as follows:    No past medical history on file.  No past surgical history on file.  OB History    No obstetric history on file.       Social History     Tobacco Use    Smoking status: Never     Passive exposure: Never    Smokeless tobacco: Never   Vaping Use    Vaping status: Never Used   Substance Use Topics    Alcohol use: Not Currently    Drug use: Never     Social History     Substance and Sexual Activity   Sexual Activity Not on file     Cancer-related family history is not on file.    Current Outpatient Medications   Medication Instructions    cetirizine (ZYRTEC) 10 mg, Oral, Daily    ibuprofen (MOTRIN) 800 mg, Oral, Every 6 hours PRN    levonorgestrel-ethinyl estradiol (Sronyx) 0.1-20 MG-MCG per tablet 1 tablet, Oral, Daily    oxymetazoline (AFRIN) 0.05 % nasal spray 2 sprays, Each Nare, 2 times daily    pseudoephedrine-guaifenesin (MUCINEX D)  MG per tablet 1 tablet, Oral, Every 12 hours    tetrahydrozoline-zinc (VISINE-AC) 0.05-0.25 % ophthalmic solution 2 drops, Ophthalmic, 3 times daily PRN       Review of Systems:  Review of Systems   Constitutional:  Negative for fever.   Respiratory:  Negative for shortness of breath.    Cardiovascular:  Negative for chest pain and leg swelling.   Genitourinary:  Negative for dysuria, genital sores, hematuria, menstrual problem, pelvic pain, vaginal bleeding, vaginal discharge and vaginal pain.   Skin:  Negative for rash.  "  Psychiatric/Behavioral:  Negative for sleep disturbance.        Physical Exam:  /84 (BP Location: Left arm, Patient Position: Sitting, Cuff Size: Standard)   Pulse 83   Temp 97.5 °F (36.4 °C) (Temporal)   Resp 18   Ht 5' 6\" (1.676 m)   Wt 86.3 kg (190 lb 3.2 oz)   LMP 02/26/2025 (Approximate)   SpO2 99%   BMI 30.70 kg/m²   Physical Exam  Constitutional:       General: She is not in acute distress.     Appearance: Normal appearance.   Genitourinary:      Right Labia: No lesions.     Left Labia: No tenderness.     No vaginal discharge, tenderness or bleeding.        Right Adnexa: not tender.     Left Adnexa: not tender.     No cervical discharge, friability, lesion or polyp.      No urethral tenderness present.   Breasts:     Breasts are symmetrical.      Right: No inverted nipple, mass, nipple discharge or skin change.      Left: No inverted nipple, mass, nipple discharge or skin change.   HENT:      Right Ear: External ear normal.      Left Ear: External ear normal.      Nose: Nose normal.   Eyes:      General: No scleral icterus.     Conjunctiva/sclera: Conjunctivae normal.   Neck:      Thyroid: No thyroid mass, thyromegaly or thyroid tenderness.   Cardiovascular:      Rate and Rhythm: Normal rate and regular rhythm.      Pulses: Normal pulses.      Heart sounds: Normal heart sounds. No murmur heard.     No gallop.   Pulmonary:      Effort: Pulmonary effort is normal. No respiratory distress.      Breath sounds: No wheezing or rhonchi.   Abdominal:      General: Bowel sounds are normal. There is no distension.      Palpations: Abdomen is soft. There is no mass.      Tenderness: There is no abdominal tenderness. There is no guarding.   Musculoskeletal:      Right lower leg: No edema.      Left lower leg: No edema.   Lymphadenopathy:      Upper Body:      Right upper body: No supraclavicular or axillary adenopathy.      Left upper body: No supraclavicular or axillary adenopathy.   Neurological:      " Mental Status: She is alert.   Skin:     General: Skin is warm and dry.      Capillary Refill: Capillary refill takes less than 2 seconds.   Psychiatric:         Mood and Affect: Mood normal.         Behavior: Behavior normal.   Exam conducted with a chaperone present.           Assessment/Plan:   1. Normal well-woman GYN exam.  2. Cervical cancer screening:  Normal cervical exam. Pap smear done with HPV co-testing reflex.   3. STD screening:  Patient declines.   Depression Screening: Patient's depression screening was assessed with a PHQ-2: 0   BMI Counseling: Body mass index is 30.7 kg/m². Discussed the patient's BMI with her. The BMI is above normal. Nutrition recommendations include reducing portion sizes, 3-5 servings of fruits/vegetables daily, moderation in carbohydrate intake, increasing intake of lean protein, and reducing intake of cholesterol.   Contraception:  OCP. Refill    Return to office in 2 weeks for weight management visit

## 2025-03-17 ENCOUNTER — ANNUAL EXAM (OUTPATIENT)
Dept: FAMILY MEDICINE CLINIC | Facility: CLINIC | Age: 30
End: 2025-03-17

## 2025-03-17 VITALS
WEIGHT: 190.2 LBS | RESPIRATION RATE: 18 BRPM | HEART RATE: 83 BPM | OXYGEN SATURATION: 99 % | TEMPERATURE: 97.5 F | SYSTOLIC BLOOD PRESSURE: 132 MMHG | DIASTOLIC BLOOD PRESSURE: 84 MMHG | BODY MASS INDEX: 30.57 KG/M2 | HEIGHT: 66 IN

## 2025-03-17 DIAGNOSIS — Z30.09 FAMILY PLANNING: ICD-10-CM

## 2025-03-17 DIAGNOSIS — Z11.4 SCREENING FOR HIV (HUMAN IMMUNODEFICIENCY VIRUS): ICD-10-CM

## 2025-03-17 DIAGNOSIS — E66.9 OBESITY (BMI 30-39.9): Primary | ICD-10-CM

## 2025-03-17 DIAGNOSIS — Z11.59 NEED FOR HEPATITIS C SCREENING TEST: ICD-10-CM

## 2025-03-17 PROCEDURE — G0145 SCR C/V CYTO,THINLAYER,RESCR: HCPCS

## 2025-03-17 PROCEDURE — 99395 PREV VISIT EST AGE 18-39: CPT | Performed by: FAMILY MEDICINE

## 2025-03-17 PROCEDURE — G0476 HPV COMBO ASSAY CA SCREEN: HCPCS

## 2025-03-17 RX ORDER — LEVONORGESTREL/ETHIN.ESTRADIOL 0.1-0.02MG
1 TABLET ORAL DAILY
Qty: 84 TABLET | Refills: 3 | Status: SHIPPED | OUTPATIENT
Start: 2025-03-17

## 2025-03-19 LAB
HPV HR 12 DNA CVX QL NAA+PROBE: NEGATIVE
HPV16 DNA CVX QL NAA+PROBE: NEGATIVE
HPV18 DNA CVX QL NAA+PROBE: NEGATIVE

## 2025-03-21 LAB
LAB AP GYN PRIMARY INTERPRETATION: NORMAL
Lab: NORMAL

## 2025-03-24 ENCOUNTER — RESULTS FOLLOW-UP (OUTPATIENT)
Dept: FAMILY MEDICINE CLINIC | Facility: CLINIC | Age: 30
End: 2025-03-24

## 2025-03-25 ENCOUNTER — APPOINTMENT (OUTPATIENT)
Dept: LAB | Age: 30
End: 2025-03-25
Payer: COMMERCIAL

## 2025-03-25 ENCOUNTER — HOSPITAL ENCOUNTER (OUTPATIENT)
Dept: MAMMOGRAPHY | Facility: CLINIC | Age: 30
Discharge: HOME | End: 2025-03-25
Payer: COMMERCIAL

## 2025-03-25 DIAGNOSIS — D24.9: ICD-10-CM

## 2025-03-25 DIAGNOSIS — E66.9 OBESITY (BMI 30-39.9): ICD-10-CM

## 2025-03-25 DIAGNOSIS — Z11.59 NEED FOR HEPATITIS C SCREENING TEST: ICD-10-CM

## 2025-03-25 DIAGNOSIS — Z11.4 SCREENING FOR HIV (HUMAN IMMUNODEFICIENCY VIRUS): ICD-10-CM

## 2025-03-25 LAB
ALBUMIN SERPL BCG-MCNC: 4.3 G/DL (ref 3.5–5)
ALP SERPL-CCNC: 93 U/L (ref 34–104)
ALT SERPL W P-5'-P-CCNC: 27 U/L (ref 7–52)
ANION GAP SERPL CALCULATED.3IONS-SCNC: 8 MMOL/L (ref 4–13)
AST SERPL W P-5'-P-CCNC: 25 U/L (ref 13–39)
BILIRUB SERPL-MCNC: 0.63 MG/DL (ref 0.2–1)
BUN SERPL-MCNC: 7 MG/DL (ref 5–25)
CALCIUM SERPL-MCNC: 9.4 MG/DL (ref 8.4–10.2)
CHLORIDE SERPL-SCNC: 105 MMOL/L (ref 96–108)
CHOLEST SERPL-MCNC: 180 MG/DL (ref ?–200)
CO2 SERPL-SCNC: 27 MMOL/L (ref 21–32)
CREAT SERPL-MCNC: 0.66 MG/DL (ref 0.6–1.3)
GFR SERPL CREATININE-BSD FRML MDRD: 120 ML/MIN/1.73SQ M
GLUCOSE P FAST SERPL-MCNC: 87 MG/DL (ref 65–99)
HDLC SERPL-MCNC: 47 MG/DL
LDLC SERPL CALC-MCNC: 116 MG/DL (ref 0–100)
NONHDLC SERPL-MCNC: 133 MG/DL
POTASSIUM SERPL-SCNC: 4.3 MMOL/L (ref 3.5–5.3)
PROT SERPL-MCNC: 7.7 G/DL (ref 6.4–8.4)
SODIUM SERPL-SCNC: 140 MMOL/L (ref 135–147)
TRIGL SERPL-MCNC: 87 MG/DL (ref ?–150)
TSH SERPL DL<=0.05 MIU/L-ACNC: 1.65 UIU/ML (ref 0.45–4.5)

## 2025-03-25 PROCEDURE — 87389 HIV-1 AG W/HIV-1&-2 AB AG IA: CPT

## 2025-03-25 PROCEDURE — 80053 COMPREHEN METABOLIC PANEL: CPT

## 2025-03-25 PROCEDURE — 80061 LIPID PANEL: CPT

## 2025-03-25 PROCEDURE — 84443 ASSAY THYROID STIM HORMONE: CPT

## 2025-03-25 PROCEDURE — 86803 HEPATITIS C AB TEST: CPT

## 2025-03-25 PROCEDURE — 76642 ULTRASOUND BREAST LIMITED: CPT

## 2025-03-25 PROCEDURE — 36415 COLL VENOUS BLD VENIPUNCTURE: CPT

## 2025-03-26 LAB
HCV AB SER QL: NORMAL
HIV 1+2 AB+HIV1 P24 AG SERPL QL IA: NORMAL

## 2025-04-17 ENCOUNTER — OFFICE VISIT (OUTPATIENT)
Dept: FAMILY MEDICINE CLINIC | Facility: CLINIC | Age: 30
End: 2025-04-17

## 2025-04-17 VITALS
OXYGEN SATURATION: 99 % | BODY MASS INDEX: 30.57 KG/M2 | HEART RATE: 79 BPM | SYSTOLIC BLOOD PRESSURE: 126 MMHG | WEIGHT: 190.2 LBS | RESPIRATION RATE: 18 BRPM | DIASTOLIC BLOOD PRESSURE: 66 MMHG | HEIGHT: 66 IN | TEMPERATURE: 98 F

## 2025-04-17 DIAGNOSIS — E66.9 OBESITY (BMI 30-39.9): Primary | ICD-10-CM

## 2025-04-17 NOTE — PROGRESS NOTES
"Name: Mari Eisenberg      : 1995      MRN: [id number]  Encounter Provider: Rody Morataya MD  Encounter Date: 2025   Encounter department: [facility]  :  Assessment & Plan  Obesity (BMI 30-39.9)  Due to overeating.  Thyroid disorders and depression were ruled out  Patient agreed on trial of lifestyle modification for now.    Will consider medications if lifestyle modifications failed  Return in 4 weeks                History of Present Illness   Mari is a 29 y.o female presenting for obesity management.     Diet: Tries to eat healthy most of the time, but reports emotional eating frequently.   Exercise: Sedentary    Waist: 100 cm   Neck: 39 cm   Weight: 86.3       Review of Systems   Constitutional:  Negative for fatigue and fever.   HENT:  Negative for congestion and sore throat.    Respiratory:  Negative for cough and shortness of breath.    Cardiovascular:  Negative for chest pain and leg swelling.   Gastrointestinal:  Negative for abdominal pain, constipation, diarrhea and vomiting.   Endocrine: Negative for cold intolerance and heat intolerance.   Genitourinary:  Negative for dysuria and hematuria.   Musculoskeletal:  Negative for arthralgias.   Skin:  Negative for rash.   Neurological:  Negative for headaches.   Psychiatric/Behavioral:  Negative for behavioral problems, sleep disturbance and suicidal ideas. The patient is not nervous/anxious.        Objective   /66 (BP Location: Left arm, Patient Position: Sitting, Cuff Size: Standard)   Pulse 79   Temp 98 °F (36.7 °C) (Temporal)   Resp 18   Ht 5' 6\" (1.676 m)   Wt 86.3 kg (190 lb 3.2 oz)   LMP 2025 (Approximate)   SpO2 99%   BMI 30.70 kg/m²      Physical Exam  Constitutional:       General: She is not in acute distress.     Appearance: She is not ill-appearing or toxic-appearing.   HENT:      Right Ear: External ear normal.      Left Ear: External ear normal.      Nose: Nose normal. "   Eyes:      General: No scleral icterus.     Conjunctiva/sclera: Conjunctivae normal.   Cardiovascular:      Rate and Rhythm: Normal rate and regular rhythm.      Heart sounds: No murmur heard.     No gallop.   Pulmonary:      Effort: Pulmonary effort is normal. No respiratory distress.      Breath sounds: No wheezing or rhonchi.   Abdominal:      General: There is no distension.      Palpations: Abdomen is soft.      Tenderness: There is no abdominal tenderness. There is no guarding.   Musculoskeletal:      Right lower leg: No edema.      Left lower leg: No edema.   Skin:     General: Skin is warm and dry.      Capillary Refill: Capillary refill takes less than 2 seconds.   Neurological:      General: No focal deficit present.      Mental Status: She is alert and oriented to person, place, and time.   Psychiatric:         Mood and Affect: Mood normal.         Behavior: Behavior normal.

## 2025-04-17 NOTE — ASSESSMENT & PLAN NOTE
Due to overeating.  Thyroid disorders and depression were ruled out  Patient agreed on trial of lifestyle modification for now.    Will consider medications if lifestyle modifications failed  Return in 4 weeks

## 2025-05-16 ENCOUNTER — OFFICE VISIT (OUTPATIENT)
Dept: FAMILY MEDICINE CLINIC | Facility: CLINIC | Age: 30
End: 2025-05-16

## 2025-05-16 VITALS
HEIGHT: 66 IN | WEIGHT: 182 LBS | RESPIRATION RATE: 16 BRPM | SYSTOLIC BLOOD PRESSURE: 122 MMHG | HEART RATE: 84 BPM | DIASTOLIC BLOOD PRESSURE: 70 MMHG | OXYGEN SATURATION: 99 % | BODY MASS INDEX: 29.25 KG/M2 | TEMPERATURE: 98.9 F

## 2025-05-16 DIAGNOSIS — J02.0 STREP THROAT: ICD-10-CM

## 2025-05-16 DIAGNOSIS — B97.89 SORE THROAT (VIRAL): Primary | ICD-10-CM

## 2025-05-16 DIAGNOSIS — J02.8 SORE THROAT (VIRAL): Primary | ICD-10-CM

## 2025-05-16 RX ORDER — AMOXICILLIN 500 MG/1
500 CAPSULE ORAL EVERY 8 HOURS SCHEDULED
Qty: 30 CAPSULE | Refills: 0 | Status: SHIPPED | OUTPATIENT
Start: 2025-05-16 | End: 2025-05-26

## 2025-05-16 NOTE — ASSESSMENT & PLAN NOTE
Centor criteria 3  Rapid strep test positive    -Start amoxicillin 500 mg bid  -Warm water gargles prn  -Tylenol for pain prn  -Keep well hydrated  -F/U w/ pcp if no improvement

## 2025-05-16 NOTE — PROGRESS NOTES
Name: Mari Eisenberg      : 1995      MRN: [id number]  Encounter Provider: Mariam Varner MD  Encounter Date: 2025   Encounter department: [facility]  :  Assessment & Plan  Sore throat (viral)  Viral vs. bacterial etiology  Strep throat confirmed on physical exam and rapid test   Orders:    POCT rapid ANTIGEN strepA    amoxicillin (AMOXIL) 500 mg capsule; Take 1 capsule (500 mg total) by mouth every 8 (eight) hours for 10 days    Strep throat  Centor criteria 3  Rapid strep test positive    -Start amoxicillin 500 mg bid  -Warm water gargles prn  -Tylenol for pain prn  -Keep well hydrated  -F/U w/ pcp if no improvement                 History of Present Illness   30 yo female presents for same day care due to 2 days of unquantifie fever, sore throat and generalized muscle pain. She denies nausea, vomiting, constipation or diarrhea.   She denies recent travel, hiking  or exposure to zoonotic animals.   Of note, she has a 5 year old son who attends school and is unaware if their has been a frequency of sick children.      Earache   Associated symptoms include a sore throat. Pertinent negatives include no abdominal pain, coughing, rash or vomiting.   Sore Throat   Associated symptoms include ear pain. Pertinent negatives include no abdominal pain, coughing, shortness of breath or vomiting.     Review of Systems   Constitutional:  Negative for chills and fever.   HENT:  Positive for ear pain and sore throat.    Eyes:  Negative for pain and visual disturbance.   Respiratory:  Negative for cough and shortness of breath.    Cardiovascular:  Negative for chest pain and palpitations.   Gastrointestinal:  Negative for abdominal pain and vomiting.   Genitourinary:  Negative for dysuria and hematuria.   Musculoskeletal:  Negative for arthralgias and back pain.   Skin:  Negative for color change and rash.   Neurological:  Negative for seizures and syncope.   All  "other systems reviewed and are negative.      Objective   /70 (BP Location: Left arm, Patient Position: Sitting, Cuff Size: Standard)   Pulse 84   Temp 98.9 °F (37.2 °C) (Temporal)   Resp 16   Ht 5' 6\" (1.676 m)   Wt 82.6 kg (182 lb)   SpO2 99%   BMI 29.38 kg/m²      Physical Exam  Vitals and nursing note reviewed.   Constitutional:       General: She is not in acute distress.     Appearance: She is well-developed.   HENT:      Head: Normocephalic and atraumatic.      Mouth/Throat:      Lips: Pink.      Mouth: Mucous membranes are moist.      Tongue: No lesions. Tongue does not deviate from midline.      Palate: No mass.      Pharynx: Pharyngeal swelling, oropharyngeal exudate and posterior oropharyngeal erythema present.      Tonsils: No tonsillar exudate or tonsillar abscesses.     Eyes:      Conjunctiva/sclera: Conjunctivae normal.       Cardiovascular:      Rate and Rhythm: Normal rate and regular rhythm.      Heart sounds: No murmur heard.  Pulmonary:      Effort: Pulmonary effort is normal. No respiratory distress.      Breath sounds: Normal breath sounds.   Abdominal:      Palpations: Abdomen is soft.      Tenderness: There is no abdominal tenderness.     Musculoskeletal:         General: No swelling.      Cervical back: Neck supple.     Skin:     General: Skin is warm and dry.      Capillary Refill: Capillary refill takes less than 2 seconds.     Neurological:      Mental Status: She is alert.     Psychiatric:         Mood and Affect: Mood normal.         "